# Patient Record
Sex: FEMALE | Race: WHITE | NOT HISPANIC OR LATINO | Employment: STUDENT | URBAN - METROPOLITAN AREA
[De-identification: names, ages, dates, MRNs, and addresses within clinical notes are randomized per-mention and may not be internally consistent; named-entity substitution may affect disease eponyms.]

---

## 2023-02-24 PROBLEM — R51.9 ACUTE NONINTRACTABLE HEADACHE: Status: ACTIVE | Noted: 2023-02-24

## 2023-02-24 PROBLEM — F50.9 DISORDERED EATING: Status: ACTIVE | Noted: 2023-02-24

## 2023-02-24 PROBLEM — F41.9 ANXIETY: Status: ACTIVE | Noted: 2023-02-24

## 2023-02-24 PROBLEM — F50.20 PURGING: Status: ACTIVE | Noted: 2023-02-24

## 2023-02-24 PROBLEM — F50.2: Status: ACTIVE | Noted: 2023-02-24

## 2023-02-24 PROBLEM — R50.9 FEVER: Status: ACTIVE | Noted: 2023-02-24

## 2023-02-24 PROBLEM — F45.22 BODY IMAGE DISTURBANCE: Status: ACTIVE | Noted: 2023-02-24

## 2023-02-24 RX ORDER — FLUOXETINE HYDROCHLORIDE 60 MG/1
60 TABLET, FILM COATED ORAL; ORAL DAILY
COMMUNITY
Start: 2021-12-10 | End: 2023-03-30 | Stop reason: ALTCHOICE

## 2023-02-24 RX ORDER — FLUOXETINE HYDROCHLORIDE 40 MG/1
1 CAPSULE ORAL DAILY
COMMUNITY
Start: 2022-01-21 | End: 2023-03-30 | Stop reason: ALTCHOICE

## 2023-02-24 RX ORDER — CEFDINIR 300 MG/1
300 CAPSULE ORAL EVERY 12 HOURS
COMMUNITY
Start: 2022-06-17 | End: 2023-03-30 | Stop reason: ALTCHOICE

## 2023-03-30 ENCOUNTER — OFFICE VISIT (OUTPATIENT)
Dept: PEDIATRICS | Facility: CLINIC | Age: 18
End: 2023-03-30
Payer: COMMERCIAL

## 2023-03-30 VITALS
SYSTOLIC BLOOD PRESSURE: 108 MMHG | HEIGHT: 66 IN | BODY MASS INDEX: 33.11 KG/M2 | DIASTOLIC BLOOD PRESSURE: 64 MMHG | WEIGHT: 206 LBS | HEART RATE: 87 BPM

## 2023-03-30 DIAGNOSIS — Z00.00 WELLNESS EXAMINATION: Primary | ICD-10-CM

## 2023-03-30 PROBLEM — F50.9 DISORDERED EATING: Status: RESOLVED | Noted: 2023-02-24 | Resolved: 2023-03-30

## 2023-03-30 PROBLEM — R51.9 ACUTE NONINTRACTABLE HEADACHE: Status: RESOLVED | Noted: 2023-02-24 | Resolved: 2023-03-30

## 2023-03-30 PROBLEM — R50.9 FEVER: Status: RESOLVED | Noted: 2023-02-24 | Resolved: 2023-03-30

## 2023-03-30 PROBLEM — F50.9 EATING DISORDER: Status: ACTIVE | Noted: 2022-08-17

## 2023-03-30 PROCEDURE — 1036F TOBACCO NON-USER: CPT | Performed by: PEDIATRICS

## 2023-03-30 PROCEDURE — 99395 PREV VISIT EST AGE 18-39: CPT | Performed by: PEDIATRICS

## 2023-03-30 PROCEDURE — 96127 BRIEF EMOTIONAL/BEHAV ASSMT: CPT | Performed by: PEDIATRICS

## 2023-03-30 NOTE — PROGRESS NOTES
Subjective   Chio is a 17 y.o. female who presents today for her Health Maintenance and Supervision Exam.    General Health:  Chio is overall in good health.  She is working with a psychiatrist at [facility] to manage her eating disorder.  She says she is doing well, and is in a good place.  Is not on any medications currently.  Concerns today: None    Social and Family History:  Currently staying in the dorms at [facility].  Will move into an apartment next school year.  Is finishing her second year in college.  Doing well.    Nutrition:  Balanced diet? Yes  Current Diet: A variety of meats, vegetables, fruits with a calcium source.  Concerns about body image? No  Uses nutritional supplements? No  Denies any recent , or ongoing binging or purging.    Dental Care:  Chio has a dental home? Yes  Dental hygiene regularly performed? Yes  Fluoridate water: Yes    Elimination:  Elimination patterns appropriate: Yes  Sleep:  Sleep patterns : She tries to get 7 to 8 hours nightly  Sleep problems:  none    Behavior/Socialization:  Good relationships with parents and siblings? Yes  Normal peer relationships? Yes     Development/Education:  Age Appropriate: Yes    Activities:  Physical Activity: Yes  Works part time as a [employer]    Menstrual Status:  Regular cycle intervals: Yes    Sexual History:  Dating? No  Sexually Active? No    Drugs:  Tobacco? No  Uses drugs?  Occasional marijuana, alcohol use.  Vaping?  Occasionally    Risk Assessment:  Additional health risks: No    Safety Assessment:  Safety topics reviewed: Yes  Uses safety belts? Yes   Mouthguard for sports ? Yes   Working Smoke detectors/carbon monoxide detectors Yes   Firearms in the home? No   Secondhand smoke? No   Nonviolent home? Yes   Sunscreen use? Yes   Helmets/Sports safety gear? Yes     Mental Health:  Depression screening result Negative   Self confidence?? Yes   Coping Skills Yes   Thoughts of self harm/suicide? No     Objective   /64    "Pulse 87   Ht 1.683 m (5' 6.25\")   Wt 93.4 kg (206 lb)   BMI 33.00 kg/m²   Growth parameters are noted and are appropriate for age.  General:   alert and oriented, in no acute distress   Gait:   normal   Skin:   normal   Oral cavity:   lips, mucosa, and tongue normal; teeth and gums normal   Eyes:   sclerae white, pupils equal and reactive   Ears:   normal bilaterally   Neck:   no adenopathy and thyroid not enlarged, symmetric, no tenderness/mass/nodules   Lungs:  clear to auscultation bilaterally   Heart:   regular rate and rhythm, S1, S2 normal, no murmur, click, rub or gallop   Abdomen:  soft, non-tender; bowel sounds normal; no masses, no organomegaly   :  normal external genitalia, no erythema, no discharge   Jagdeep Stage:   5   Extremities:  extremities normal, warm and well-perfused; no cyanosis, clubbing, or edema, negative forward bend   Neuro:  normal without focal findings and muscle tone and strength normal and symmetric     Assessment/Plan   PLAN:  17 yr old here for her annual physical  Weight gain noted in the chart but not discussed. Patient is following with psychiatry for ongoing care of her eating disorder.   PHQ-9 completed. Risk factors: substance use. Advised against this. Reviewed health risks. Recommend discussion about psychiatry for continued anxious symptoms, re: restarting her Prozac.  Vaccines reviewed and or updated if applicable  Follow up in 1 year for next well child exam or sooner with concerns.           "

## 2023-07-14 ENCOUNTER — TELEMEDICINE (OUTPATIENT)
Dept: PEDIATRICS | Facility: CLINIC | Age: 18
End: 2023-07-14
Payer: COMMERCIAL

## 2023-07-14 VITALS — WEIGHT: 206 LBS | BODY MASS INDEX: 33 KG/M2

## 2023-07-14 DIAGNOSIS — F41.9 ANXIETY: ICD-10-CM

## 2023-07-14 DIAGNOSIS — F41.0 PANIC ATTACK: Primary | ICD-10-CM

## 2023-07-14 DIAGNOSIS — F50.9 EATING DISORDER, UNSPECIFIED TYPE: ICD-10-CM

## 2023-07-14 DIAGNOSIS — F45.22 BODY IMAGE DISTURBANCE: ICD-10-CM

## 2023-07-14 PROCEDURE — 99214 OFFICE O/P EST MOD 30 MIN: CPT | Performed by: PEDIATRICS

## 2023-07-14 RX ORDER — HYDROXYZINE HYDROCHLORIDE 10 MG/1
TABLET, FILM COATED ORAL
Qty: 45 TABLET | Refills: 0 | Status: SHIPPED | OUTPATIENT
Start: 2023-07-14 | End: 2023-08-07 | Stop reason: SDUPTHER

## 2023-07-14 NOTE — PROGRESS NOTES
HPI:  An interactive audio and video telecommunication system which permits real time communications, between the patient at the originating site and physician at the distant site was utilized to provide this telehealth service.   Verbal consent was requested and obtained from mildly on this date July 14, 2023 for a telehealth visit for anxiety concerns.  She reports that late last summer she stopped her Lexapro.  She was originally started on this when she was in the [facility] outpatient program last summer.  She did not feel that it was helping her much.  She had been following a mental health therapist, and eating disorder therapist as well as being monitored by an adolescent medicine/eating disorder specialist Dr. Raffaele Hunt at [facility].  She continues to see these providers.  They are aware she stopped her Lexapro.  She notes over the last few weeks she has become more worked up and anxious.  Unsure why.  She does notice that she seems more anxious now that she is home from college and staying with her family.  Recently weaned 1 week ago she reports that she was on vacation with her family-they were seen in new place and she felt very closed and and tired and developed a panic attack and was crying straight for an hour.  She is very on it if that this will happen again.  She states that at home she feels worked up and very pressured, but she is unsure why.  She feels she has a good relationship with her mom and dad.  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  Just she does admit to frequent weight checking at home.  She states that she was told not to do this by her adolescent medicine specialist.  Denies binging or purging.  She is wanting to go back on the Lexapro.  Believes she was on 20 mg but is not sure.  She denies any thoughts of suicide or self-harm.  She is not using alcohol marijuana or vaping products to cope with her anxiety.  She states she will likely continue  to work with Raffaele and like if she restarts the Lexapro but she wanted to get my opinion and recommendation.    ROS:   negative other than stated above in HPI    Vitals:    07/14/23 1422   Weight: 93.4 kg (206 lb)        Current Outpatient Medications:     hydrOXYzine HCL (Atarax) 10 mg tablet, 1 to 3 tablets by mouth for panic attacks q 8 hrs as needed., Disp: 45 tablet, Rfl: 0     Physical Exam:  Alert, happy pleasant.  Interactive    Assessment and Plan:  Anxiety, panic attacks, eating disorder.  Chio seems to have an increase in anxiety triggering panic attacks.  I do support use of antianxiety medication for her.  I do recommend she continues with her behavioral health counseling.  I discussed options for antianxiety treatment.  She states she wants to go back on Lexapro and give it another try.  She previously had failed Prozac.  Another option would be to do Zoloft.  She states she will likely follow-up with her adolescent medicine specialist.  She is going to check her records to let me know the exact dose of her Lexapro.  Plan to follow-up virtually or in office in 2 weeks after starting the Lexapro.  We will also give her a prescription of hydroxyzine to help with panic attacks.  Discussed the indications, risk benefits of this medication.  Encouraged her to continue her CBT counseling for this.  I also asked her to connect with her adolescent medicine specialist for a follow-up appointment.  Mildly indicated agreement of plan.

## 2023-07-17 ENCOUNTER — TELEPHONE (OUTPATIENT)
Dept: PEDIATRICS | Facility: CLINIC | Age: 18
End: 2023-07-17

## 2023-07-18 DIAGNOSIS — F41.9 ANXIETY: Primary | ICD-10-CM

## 2023-07-18 RX ORDER — ESCITALOPRAM OXALATE 20 MG/1
TABLET ORAL
Qty: 30 TABLET | Refills: 1 | Status: SHIPPED | OUTPATIENT
Start: 2023-07-18 | End: 2023-08-07 | Stop reason: SDUPTHER

## 2023-07-25 ENCOUNTER — APPOINTMENT (OUTPATIENT)
Dept: PEDIATRICS | Facility: CLINIC | Age: 18
End: 2023-07-25
Payer: COMMERCIAL

## 2023-08-07 ENCOUNTER — TELEMEDICINE (OUTPATIENT)
Dept: PEDIATRICS | Facility: CLINIC | Age: 18
End: 2023-08-07
Payer: COMMERCIAL

## 2023-08-07 VITALS — BODY MASS INDEX: 33 KG/M2 | WEIGHT: 206 LBS

## 2023-08-07 DIAGNOSIS — F41.0 PANIC ATTACK: ICD-10-CM

## 2023-08-07 DIAGNOSIS — F41.9 ANXIETY: ICD-10-CM

## 2023-08-07 DIAGNOSIS — F50.2: ICD-10-CM

## 2023-08-07 DIAGNOSIS — F45.22 BODY IMAGE DISTURBANCE: ICD-10-CM

## 2023-08-07 DIAGNOSIS — F50.81 BINGE EATING DISORDER: Primary | ICD-10-CM

## 2023-08-07 PROCEDURE — 99213 OFFICE O/P EST LOW 20 MIN: CPT | Performed by: PEDIATRICS

## 2023-08-07 RX ORDER — HYDROXYZINE HYDROCHLORIDE 10 MG/1
TABLET, FILM COATED ORAL
Qty: 45 TABLET | Refills: 0 | Status: SHIPPED | OUTPATIENT
Start: 2023-08-07 | End: 2023-09-21 | Stop reason: SDUPTHER

## 2023-08-07 RX ORDER — ESCITALOPRAM OXALATE 20 MG/1
TABLET ORAL
Qty: 30 TABLET | Refills: 1 | Status: SHIPPED | OUTPATIENT
Start: 2023-08-07 | End: 2023-11-27 | Stop reason: SDUPTHER

## 2023-08-07 NOTE — PROGRESS NOTES
HPI:  An interactive audio and video telecommunication system which permits real time communications, between the patient at the originating site and physician at the distant site was utilized to provide this telehealth service.   Verbal consent was requested and obtained from Chio on this date 8/7/23 for a telehealth visit for anxiety follow up. Started back on Lexapro 20mg, about 3 weeks ago. Feeling good overall. She is not sure if she feels much different. No panic attacks as before. Has had a few anxious moments for which she has taken hydroxyzine. Takes 10mg of hydrozyine 4-5 x weekly. Has taken 3 in one day over the last 3 weeks. Feels most anxious in the morning. She is not sure why. She is getting ready to go back to campus in 1 week, and is a little nervous about that. She will be in an apartment this year with 3 friends. She continues to meet in person with her counselor. States she is eating and sleeping very well. Denies thoughts of suicide, self harm, or harm to others. Denies binging or purging.    ROS:   negative other than stated above in HPI    Vitals:    08/07/23 0824   Weight: 93.4 kg (206 lb)        Current Outpatient Medications:     escitalopram (Lexapro) 20 mg tablet, 1 tablet at bedtime daily, Disp: 30 tablet, Rfl: 1    hydrOXYzine HCL (Atarax) 10 mg tablet, 1 to 3 tablets by mouth for panic attacks q 8 hrs as needed., Disp: 45 tablet, Rfl: 0     Physical Exam:  Alert. Happy. conversive  Assessment and Plan:  18 yr old female with anxiety and nina eating disorder. Doing well on the initial resumption of Lexapro 20mg. Ewill keep this dose the same. I encouraged her to keep up and the counseling, keep track of anxious symptoms. Virtual or office follow up in 4-5 weeks.

## 2023-09-21 DIAGNOSIS — F41.0 PANIC ATTACK: ICD-10-CM

## 2023-09-21 RX ORDER — HYDROXYZINE HYDROCHLORIDE 10 MG/1
TABLET, FILM COATED ORAL
Qty: 45 TABLET | Refills: 0 | Status: SHIPPED | OUTPATIENT
Start: 2023-09-21

## 2023-11-13 ENCOUNTER — OFFICE VISIT (OUTPATIENT)
Dept: OTOLARYNGOLOGY | Facility: CLINIC | Age: 18
End: 2023-11-13
Payer: COMMERCIAL

## 2023-11-13 VITALS
OXYGEN SATURATION: 99 % | TEMPERATURE: 98.1 F | HEART RATE: 92 BPM | BODY MASS INDEX: 33.95 KG/M2 | WEIGHT: 211.25 LBS | RESPIRATION RATE: 18 BRPM | SYSTOLIC BLOOD PRESSURE: 119 MMHG | HEIGHT: 66 IN | DIASTOLIC BLOOD PRESSURE: 63 MMHG

## 2023-11-13 DIAGNOSIS — J35.8 TONSIL STONE: Primary | ICD-10-CM

## 2023-11-13 DIAGNOSIS — J35.01 CHRONIC TONSILLITIS: ICD-10-CM

## 2023-11-13 PROCEDURE — 99214 OFFICE O/P EST MOD 30 MIN: CPT | Performed by: STUDENT IN AN ORGANIZED HEALTH CARE EDUCATION/TRAINING PROGRAM

## 2023-11-13 PROCEDURE — 99204 OFFICE O/P NEW MOD 45 MIN: CPT | Performed by: STUDENT IN AN ORGANIZED HEALTH CARE EDUCATION/TRAINING PROGRAM

## 2023-11-13 PROCEDURE — 1036F TOBACCO NON-USER: CPT | Performed by: STUDENT IN AN ORGANIZED HEALTH CARE EDUCATION/TRAINING PROGRAM

## 2023-11-13 RX ORDER — SODIUM CHLORIDE, SODIUM LACTATE, POTASSIUM CHLORIDE, CALCIUM CHLORIDE 600; 310; 30; 20 MG/100ML; MG/100ML; MG/100ML; MG/100ML
100 INJECTION, SOLUTION INTRAVENOUS CONTINUOUS
Status: CANCELLED | OUTPATIENT
Start: 2023-11-13

## 2023-11-13 ASSESSMENT — ENCOUNTER SYMPTOMS
DEPRESSION: 0
LOSS OF SENSATION IN FEET: 0
OCCASIONAL FEELINGS OF UNSTEADINESS: 0

## 2023-11-13 ASSESSMENT — COLUMBIA-SUICIDE SEVERITY RATING SCALE - C-SSRS
2. HAVE YOU ACTUALLY HAD ANY THOUGHTS OF KILLING YOURSELF?: NO
1. IN THE PAST MONTH, HAVE YOU WISHED YOU WERE DEAD OR WISHED YOU COULD GO TO SLEEP AND NOT WAKE UP?: NO
6. HAVE YOU EVER DONE ANYTHING, STARTED TO DO ANYTHING, OR PREPARED TO DO ANYTHING TO END YOUR LIFE?: NO
6. HAVE YOU EVER DONE ANYTHING, STARTED TO DO ANYTHING, OR PREPARED TO DO ANYTHING TO END YOUR LIFE?: NO

## 2023-11-13 ASSESSMENT — PATIENT HEALTH QUESTIONNAIRE - PHQ9
SUM OF ALL RESPONSES TO PHQ9 QUESTIONS 1 AND 2: 0
1. LITTLE INTEREST OR PLEASURE IN DOING THINGS: NOT AT ALL
2. FEELING DOWN, DEPRESSED OR HOPELESS: NOT AT ALL

## 2023-11-13 ASSESSMENT — PAIN SCALES - GENERAL: PAINLEVEL: 4

## 2023-11-13 NOTE — PROGRESS NOTES
SUBJECTIVE  Patient ID: Chio Logan is a 18 y.o. female who presents for New Patient Visit (Tonsill issues ).    The patient reports recurrent tonsils stones; several times a week. These cause her discomfort and swelling. Seems worse on the right. Causing halitosis. Tried brushing her teeth; has not helped. Not having recurrent tonsil infections. Has not tried antibiotics for these yet.    No history surgery. No known family history of bleeding disorders.    Review of Systems  Complete ROS negative except as noted above or on patient intake form and as above.    OBJECTIVE  Physical Exam  CONSTITUTIONAL: Well appearing female who appears stated age.  PSYCHIATRIC: Alert, appropriate mood and affect.  RESPIRATORY: Normal inspiration and expiration and chest wall expansion; no use of accessory muscles to breathe.  VOICE: Clear speech without hoarseness. No stridor nor stertor.  HEAD, FACE, AND SKIN: Symmetric facial feature. No cutaneous masses or lesions were visualized. The parotid and submandibular glands were normal to palpation.  EYES: Pupils were equal in size and reactive to light. Extra-ocular muscle function was intact. No nystagmus was observed. Vision was grossly intact.  EARS: External ears were normally formed with no lesions. The external auditory canals were clear. The tympanic membranes were intact and in the neutral position. No significant retraction pockets nor effusions were appreciated.  NOSE: Nasal dorsum was midline. Anterior rhinoscopy demonstrated a septum midline. Inferior turbinates were mildly hypertrophied. No obvious nasal masses, polyps, mucopurulence, nor other lesions were appreciated.  ORAL CAVITY: Lips were without lesions. Moist mucous membranes. No lesions appreciated along the gingiva, oral mucosa, nor tongue.  DENTITION: Grossly normal without obvious infection nor inflammation.  OROPHARYNX: No lesion nor mucosal abnormality. The uvula was normal appearing. The tonsils were  2-3+.  NECK: Visualization and palpation of the neck revealed no mass lesions, no thyromegaly or thyroid masses. No cutaneous lesions appreciated.  LYMPHATICS (CERVICAL): There were no palpable lymph nodes in the posterior triangle, submandibular triangle, jugulodigastric region, nor central neck.  NEUROLOGIC: Cranial nerves III, IV, and VI were noted to be intact via extra-ocular muscle movement testing. Cranial nerve V was noted to be intact to soft touch bilaterally. Cranial nerve VII was noted to be intact and symmetric by facial movement. Cranial nerve VIII was tested with normal voice examination and revealed grossly normal hearing. Cranial nerves IX and X noted to be intact by palatal movement. Cranial nerve XI noted to be intact via shoulder shrug.  Cranial nerve XII noted to be intact with active and symmetric tongue movement.      ASSESSMENT/PLAN  Diagnoses and all orders for this visit:  Tonsil stone  Chronic tonsillitis    18 y.o. female here for chronic tonsillitis and tonsil stones.    Chronic tonsillitis, tonsil stones  The patient has longstanding issue with recurrent tonsil stones in the setting of chronic tonsillitis. The patient's findings including physical exam were discussed.  All options including continued observation, medical management, and surgical management were discussed.  The patient is interested in tonsillectomy with possible adenoidectomy at this time. The risks, benefits, and alternatives to these procedures were discussed with the patient.  Risks discussed included but were not limited to pain, dehydration, weight loss, bad breath, bleeding, including life-threatening bleeding, risks of anesthesia, and other unpredictable risks.  The patient understands these risks and elected to proceed.  Informed consent was signed.    This note was created using speech recognition transcription software. Despite proofreading, typographical errors may be present that affect the meaning of the  content. Please contact my office with any questions.

## 2023-11-27 DIAGNOSIS — F41.9 ANXIETY: ICD-10-CM

## 2023-11-30 RX ORDER — ESCITALOPRAM OXALATE 20 MG/1
TABLET ORAL
Qty: 30 TABLET | Refills: 0 | Status: SHIPPED | OUTPATIENT
Start: 2023-11-30 | End: 2024-02-19 | Stop reason: SDUPTHER

## 2024-01-04 ENCOUNTER — ANESTHESIA EVENT (OUTPATIENT)
Dept: OPERATING ROOM | Facility: HOSPITAL | Age: 19
End: 2024-01-04
Payer: COMMERCIAL

## 2024-01-04 ENCOUNTER — PRE-ADMISSION TESTING (OUTPATIENT)
Dept: PREADMISSION TESTING | Facility: HOSPITAL | Age: 19
End: 2024-01-04
Payer: COMMERCIAL

## 2024-01-04 VITALS
OXYGEN SATURATION: 99 % | TEMPERATURE: 95.4 F | HEART RATE: 80 BPM | WEIGHT: 218.26 LBS | RESPIRATION RATE: 19 BRPM | BODY MASS INDEX: 35.08 KG/M2 | DIASTOLIC BLOOD PRESSURE: 70 MMHG | SYSTOLIC BLOOD PRESSURE: 116 MMHG | HEIGHT: 66 IN

## 2024-01-04 DIAGNOSIS — J35.8 TONSIL STONE: ICD-10-CM

## 2024-01-04 DIAGNOSIS — J35.01 CHRONIC TONSILLITIS: ICD-10-CM

## 2024-01-04 LAB
ANION GAP SERPL CALC-SCNC: 12 MMOL/L (ref 10–20)
APTT PPP: 29 SECONDS (ref 27–38)
BUN SERPL-MCNC: 15 MG/DL (ref 6–23)
CALCIUM SERPL-MCNC: 9.3 MG/DL (ref 8.6–10.3)
CHLORIDE SERPL-SCNC: 103 MMOL/L (ref 98–107)
CO2 SERPL-SCNC: 27 MMOL/L (ref 21–32)
CREAT SERPL-MCNC: 0.87 MG/DL (ref 0.5–1.05)
ERYTHROCYTE [DISTWIDTH] IN BLOOD BY AUTOMATED COUNT: 12.8 % (ref 11.5–14.5)
GFR SERPL CREATININE-BSD FRML MDRD: >90 ML/MIN/1.73M*2
GLUCOSE SERPL-MCNC: 68 MG/DL (ref 74–99)
HCT VFR BLD AUTO: 40.6 % (ref 36–46)
HGB BLD-MCNC: 13.4 G/DL (ref 12–16)
INR PPP: 1 (ref 0.9–1.1)
MCH RBC QN AUTO: 29.3 PG (ref 26–34)
MCHC RBC AUTO-ENTMCNC: 33 G/DL (ref 32–36)
MCV RBC AUTO: 89 FL (ref 80–100)
NRBC BLD-RTO: 0 /100 WBCS (ref 0–0)
PLATELET # BLD AUTO: 279 X10*3/UL (ref 150–450)
POTASSIUM SERPL-SCNC: 3.8 MMOL/L (ref 3.5–5.3)
PROTHROMBIN TIME: 11.3 SECONDS (ref 9.8–12.8)
RBC # BLD AUTO: 4.57 X10*6/UL (ref 4–5.2)
SODIUM SERPL-SCNC: 138 MMOL/L (ref 136–145)
WBC # BLD AUTO: 10.4 X10*3/UL (ref 4.4–11.3)

## 2024-01-04 PROCEDURE — 82374 ASSAY BLOOD CARBON DIOXIDE: CPT

## 2024-01-04 PROCEDURE — 85610 PROTHROMBIN TIME: CPT

## 2024-01-04 PROCEDURE — 99203 OFFICE O/P NEW LOW 30 MIN: CPT | Performed by: NURSE PRACTITIONER

## 2024-01-04 PROCEDURE — 36415 COLL VENOUS BLD VENIPUNCTURE: CPT

## 2024-01-04 PROCEDURE — 85027 COMPLETE CBC AUTOMATED: CPT

## 2024-01-04 ASSESSMENT — DUKE ACTIVITY SCORE INDEX (DASI)
CAN YOU DO MODERATE WORK AROUND THE HOUSE LIKE VACUUMING, SWEEPING FLOORS OR CARRYING GROCERIES: YES
CAN YOU DO HEAVY WORK AROUND THE HOUSE LIKE SCRUBBING FLOORS OR LIFTING AND MOVING HEAVY FURNITURE: YES
CAN YOU CLIMB A FLIGHT OF STAIRS OR WALK UP A HILL: YES
CAN YOU WALK A BLOCK OR TWO ON LEVEL GROUND: YES
CAN YOU PARTICIPATE IN STRENOUS SPORTS LIKE SWIMMING, SINGLES TENNIS, FOOTBALL, BASKETBALL, OR SKIING: NO
CAN YOU TAKE CARE OF YOURSELF (EAT, DRESS, BATHE, OR USE TOILET): YES
DASI METS SCORE: 7
CAN YOU RUN A SHORT DISTANCE: YES
CAN YOU DO YARD WORK LIKE RAKING LEAVES, WEEDING OR PUSHING A MOWER: NO
CAN YOU PARTICIPATE IN MODERATE RECREATIONAL ACTIVITIES LIKE GOLF, BOWLING, DANCING, DOUBLES TENNIS OR THROWING A BASEBALL OR FOOTBALL: NO
CAN YOU DO LIGHT WORK AROUND THE HOUSE LIKE DUSTING OR WASHING DISHES: YES
CAN YOU HAVE SEXUAL RELATIONS: NO
CAN YOU WALK INDOORS, SUCH AS AROUND YOUR HOUSE: YES
TOTAL_SCORE: 34.95

## 2024-01-04 ASSESSMENT — PAIN SCALES - GENERAL: PAINLEVEL_OUTOF10: 8

## 2024-01-04 ASSESSMENT — PAIN DESCRIPTION - DESCRIPTORS: DESCRIPTORS: ACHING

## 2024-01-04 NOTE — CPM/PAT H&P
"CPM/PAT Evaluation       Name: Chio Logan (Chio Logan)  /Age: 2005/18 y.o.     In-Person       Chief Complaint:     18  yr old female with c/o chronic sore throat since 2022    She c/o frequent tonsil stones that cause pain.  She states she would try to pick out and happened more frequently  She states she may have had strep as a child.     She c/o stones occurring multiple times/ week, worse in the AM.  She states pain is rated an 8, as the stone enlarges and after she removes the stone. She states pain is like \"knives.\"   She c/o daily halitosis  She denies trouble swallowing  She states cannot recall any trigger to stones   She is a mouth breather during sleep.    She states she tried improving oral hygiene with no improvement.  She increases cold water intake for pain.     Denies any cardiac or respiratory symptoms.  Denies any past issues with anesthesia.    Adenoidectomy and tonsillectomy is Scheduled on 2023 with Dr. Powers     Vitals:    24 0857   BP: 116/70   Pulse: 80   Resp: 19   Temp: 35.2 °C (95.4 °F)   SpO2: 99%          Past Medical History:   Diagnosis Date    Anxiety     Body mass index (BMI) pediatric, 5th percentile to less than 85th percentile for age 2021    BMI (body mass index), pediatric, 5% to less than 85% for age    Body mass index (BMI) pediatric, 85th percentile to less than 95th percentile for age 06/10/2019    BMI (body mass index), pediatric, 85% to less than 95% for age    Body mass index (BMI) pediatric, 85th percentile to less than 95th percentile for age 07/10/2020    Body mass index (BMI) 85th to less than 95th percentile with athletic build, pediatric    Cataract     Chronic pain disorder     Encounter for general adult medical examination without abnormal findings 2021    Examination, routine, over 18 years of age    Encounter for immunization 10/19/2021    Encounter for immunization    Encounter for routine child health examination with " abnormal findings 07/10/2020    Encounter for routine child health examination with abnormal findings    Encounter for routine child health examination without abnormal findings 06/10/2019    Encounter for routine child health examination without abnormal findings    Influenza due to other identified influenza virus with other respiratory manifestations 02/06/2020    Influenza A    Other conditions influencing health status 01/24/2017    Concussion, without loss of consciousness, initial encounter    Other conditions influencing health status 02/01/2017    Concussion, without loss of consciousness, subsequent encounter    Otitis media, unspecified, bilateral 12/14/2017    Acute bilateral otitis media    Otitis media, unspecified, bilateral 08/29/2019    Acute bilateral otitis media    Personal history of diseases of the skin and subcutaneous tissue 07/10/2020    History of contact dermatitis    Personal history of other diseases of the digestive system 07/06/2021    History of constipation    Personal history of other diseases of the nervous system and sense organs 03/04/2015    History of acute otitis media    Personal history of other specified conditions 09/10/2021    History of vomiting    Pityriasis versicolor 06/13/2018    Tinea versicolor    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    Family History   Problem Relation Name Age of Onset    Other (OVERWIGHT) Mother      Hyperlipidemia Father      Other (OVERWEIGHT) Father      Hyperlipidemia Paternal Grandfather      Hypertension Paternal Grandfather      Melanoma Paternal Grandfather         No Known Allergies    Prior to Admission medications    Medication Sig Start Date End Date Taking? Authorizing Provider   escitalopram (Lexapro) 20 mg tablet 1 tablet at bedtime daily 11/30/23   Charity Jones DO    hydrOXYzine HCL (Atarax) 10 mg tablet 1 to 3 tablets by mouth for panic attacks q 8 hrs as needed. 9/21/23   Charity Jones, DO        Review of Systems  Constitutional: NO F, chills, or sweats  Eyes: no blurred vision or visual disturbance  ENT: see HPI, denies congestion or difficulty hearing  Cardiovascular: + palpitations with anxiety, no chest pain, no edema,  and no syncope.   Respiratory: no cough,no s.o.b. and no wheezing  Gastrointestinal: GERD, no abdominal pain, no N/V, no blood in stools  Genitourinary: LMP:  12/18/2023, no dysuria, no urinary frequency, no urinary hesitancy and no feelings of urinary urgency.   Musculoskeletal: no arthralgias,  no back pain and no myalgias.   Integumentary: no new skin lesions and no rashes.   Neurological: no difficulty walking, no headache, no limb weakness, no numbness and no tingling.   Endocrine: no recent weight gain and no recent weight loss.   Hematologic/Lymphatic: no tendency for easy bruising and no swollen glands.        Physical Exam  Constitutional:       Appearance: Normal appearance.   HENT:      Mouth/Throat:      Pharynx: Uvula midline. Posterior oropharyngeal erythema present.      Tonsils: 1+ on the right. 1+ on the left.   Cardiovascular:      Rate and Rhythm: Normal rate.      Heart sounds: Normal heart sounds.   Pulmonary:      Effort: Pulmonary effort is normal.      Breath sounds: Normal breath sounds.   Abdominal:      General: Bowel sounds are normal.      Palpations: Abdomen is soft.   Musculoskeletal:         General: Normal range of motion.      Cervical back: Normal range of motion.      Right lower leg: No edema.      Left lower leg: No edema.   Skin:     General: Skin is warm and dry.   Neurological:      Mental Status: She is alert and oriented to person, place, and time.          PAT AIRWAY:   Airway:     Mallampati::  I    TM distance::  <3 FB    Neck ROM::  Full  normal        Visit Vitals  /70   Pulse 80   Temp  35.2 °C (95.4 °F)   Resp 19       DASI Risk Score      Flowsheet Row Most Recent Value   DASI SCORE 34.95   METS Score (Will be calculated only when all the questions are answered) 7          Caprini DVT Assessment    No data to display       Modified Frailty Index    No data to display       CHADS2 Stroke Risk  Current as of 8 minutes ago        N/A 3 - 100%: High Risk   2 - 3%: Medium Risk   0 - 2%: Low Risk     Last Change: N/A          This score determines the patient's risk of having a stroke if the patient has atrial fibrillation.        This score is not applicable to this patient. Components are not calculated.          Revised Cardiac Risk Index    No data to display       Apfel Simplified Score    No data to display       Risk Analysis Index Results This Encounter    No data found in the last 1 encounters.       Stop Bang Score      Flowsheet Row Most Recent Value   Do you snore loudly? 0   Do you often feel tired or fatigued after your sleep? 0   Has anyone ever observed you stop breathing in your sleep? 0   Do you have or are you being treated for high blood pressure? 0   Recent BMI (Calculated) 35.2   Is BMI greater than 35 kg/m2? 1=Yes   Age older than 50 years old? 0=No   Gender - Male 0=No

## 2024-01-04 NOTE — PREPROCEDURE INSTRUCTIONS
Medication List            Accurate as of January 4, 2024  9:05 AM. Always use your most recent med list.                escitalopram 20 mg tablet  Commonly known as: Lexapro  1 tablet at bedtime daily  Medication Adjustments for Surgery: Continue until night before surgery     hydrOXYzine HCL 10 mg tablet  Commonly known as: Atarax  1 to 3 tablets by mouth for panic attacks q 8 hrs as needed.  Medication Adjustments for Surgery: Continue until night before surgery                              NPO Instructions:    Do not eat any food after midnight the night before your surgery/procedure.    Additional Instructions:     Day of Surgery:  Wear  comfortable loose fitting clothing  Do not use moisturizers, creams, lotions or perfume  All jewelry and valuables should be left at home    Brush your teeth in am  Have designated  to take you home  No contacts for surgery  Bring urine sample

## 2024-01-05 ENCOUNTER — HOSPITAL ENCOUNTER (OUTPATIENT)
Facility: HOSPITAL | Age: 19
Setting detail: OUTPATIENT SURGERY
Discharge: HOME | End: 2024-01-05
Attending: STUDENT IN AN ORGANIZED HEALTH CARE EDUCATION/TRAINING PROGRAM | Admitting: STUDENT IN AN ORGANIZED HEALTH CARE EDUCATION/TRAINING PROGRAM
Payer: COMMERCIAL

## 2024-01-05 ENCOUNTER — ANESTHESIA (OUTPATIENT)
Dept: OPERATING ROOM | Facility: HOSPITAL | Age: 19
End: 2024-01-05
Payer: COMMERCIAL

## 2024-01-05 VITALS
SYSTOLIC BLOOD PRESSURE: 107 MMHG | BODY MASS INDEX: 35.08 KG/M2 | RESPIRATION RATE: 16 BRPM | DIASTOLIC BLOOD PRESSURE: 61 MMHG | WEIGHT: 218.26 LBS | HEIGHT: 66 IN | HEART RATE: 72 BPM | TEMPERATURE: 97.2 F | OXYGEN SATURATION: 97 %

## 2024-01-05 DIAGNOSIS — G89.18 POSTOPERATIVE PAIN: ICD-10-CM

## 2024-01-05 DIAGNOSIS — J35.8 TONSIL STONE: Primary | ICD-10-CM

## 2024-01-05 DIAGNOSIS — J35.01 CHRONIC TONSILLITIS: ICD-10-CM

## 2024-01-05 LAB — PREGNANCY TEST URINE, POC: NEGATIVE

## 2024-01-05 PROCEDURE — 3600000008 HC OR TIME - EACH INCREMENTAL 1 MINUTE - PROCEDURE LEVEL THREE: Performed by: STUDENT IN AN ORGANIZED HEALTH CARE EDUCATION/TRAINING PROGRAM

## 2024-01-05 PROCEDURE — 88304 TISSUE EXAM BY PATHOLOGIST: CPT | Performed by: PATHOLOGY

## 2024-01-05 PROCEDURE — 81025 URINE PREGNANCY TEST: CPT | Performed by: STUDENT IN AN ORGANIZED HEALTH CARE EDUCATION/TRAINING PROGRAM

## 2024-01-05 PROCEDURE — A42821 PR REMOVE TONSILS/ADENOIDS,12+ Y/O: Performed by: NURSE ANESTHETIST, CERTIFIED REGISTERED

## 2024-01-05 PROCEDURE — 3700000002 HC GENERAL ANESTHESIA TIME - EACH INCREMENTAL 1 MINUTE: Performed by: STUDENT IN AN ORGANIZED HEALTH CARE EDUCATION/TRAINING PROGRAM

## 2024-01-05 PROCEDURE — 2500000005 HC RX 250 GENERAL PHARMACY W/O HCPCS: Performed by: STUDENT IN AN ORGANIZED HEALTH CARE EDUCATION/TRAINING PROGRAM

## 2024-01-05 PROCEDURE — 3600000003 HC OR TIME - INITIAL BASE CHARGE - PROCEDURE LEVEL THREE: Performed by: STUDENT IN AN ORGANIZED HEALTH CARE EDUCATION/TRAINING PROGRAM

## 2024-01-05 PROCEDURE — 7100000010 HC PHASE TWO TIME - EACH INCREMENTAL 1 MINUTE: Performed by: STUDENT IN AN ORGANIZED HEALTH CARE EDUCATION/TRAINING PROGRAM

## 2024-01-05 PROCEDURE — 2500000001 HC RX 250 WO HCPCS SELF ADMINISTERED DRUGS (ALT 637 FOR MEDICARE OP): Performed by: STUDENT IN AN ORGANIZED HEALTH CARE EDUCATION/TRAINING PROGRAM

## 2024-01-05 PROCEDURE — 7100000009 HC PHASE TWO TIME - INITIAL BASE CHARGE: Performed by: STUDENT IN AN ORGANIZED HEALTH CARE EDUCATION/TRAINING PROGRAM

## 2024-01-05 PROCEDURE — 42821 REMOVE TONSILS AND ADENOIDS: CPT | Performed by: STUDENT IN AN ORGANIZED HEALTH CARE EDUCATION/TRAINING PROGRAM

## 2024-01-05 PROCEDURE — 3700000001 HC GENERAL ANESTHESIA TIME - INITIAL BASE CHARGE: Performed by: STUDENT IN AN ORGANIZED HEALTH CARE EDUCATION/TRAINING PROGRAM

## 2024-01-05 PROCEDURE — 2500000004 HC RX 250 GENERAL PHARMACY W/ HCPCS (ALT 636 FOR OP/ED): Performed by: NURSE ANESTHETIST, CERTIFIED REGISTERED

## 2024-01-05 PROCEDURE — 2500000005 HC RX 250 GENERAL PHARMACY W/O HCPCS: Performed by: NURSE ANESTHETIST, CERTIFIED REGISTERED

## 2024-01-05 PROCEDURE — 2500000004 HC RX 250 GENERAL PHARMACY W/ HCPCS (ALT 636 FOR OP/ED): Performed by: STUDENT IN AN ORGANIZED HEALTH CARE EDUCATION/TRAINING PROGRAM

## 2024-01-05 PROCEDURE — 2720000007 HC OR 272 NO HCPCS: Performed by: STUDENT IN AN ORGANIZED HEALTH CARE EDUCATION/TRAINING PROGRAM

## 2024-01-05 PROCEDURE — 7100000002 HC RECOVERY ROOM TIME - EACH INCREMENTAL 1 MINUTE: Performed by: STUDENT IN AN ORGANIZED HEALTH CARE EDUCATION/TRAINING PROGRAM

## 2024-01-05 PROCEDURE — A42821 PR REMOVE TONSILS/ADENOIDS,12+ Y/O: Performed by: ANESTHESIOLOGY

## 2024-01-05 PROCEDURE — 7100000001 HC RECOVERY ROOM TIME - INITIAL BASE CHARGE: Performed by: STUDENT IN AN ORGANIZED HEALTH CARE EDUCATION/TRAINING PROGRAM

## 2024-01-05 RX ORDER — HYDROMORPHONE HYDROCHLORIDE 1 MG/ML
1 INJECTION, SOLUTION INTRAMUSCULAR; INTRAVENOUS; SUBCUTANEOUS EVERY 5 MIN PRN
Status: DISCONTINUED | OUTPATIENT
Start: 2024-01-05 | End: 2024-01-05 | Stop reason: HOSPADM

## 2024-01-05 RX ORDER — PROPOFOL 10 MG/ML
INJECTION, EMULSION INTRAVENOUS CONTINUOUS PRN
Status: DISCONTINUED | OUTPATIENT
Start: 2024-01-05 | End: 2024-01-05

## 2024-01-05 RX ORDER — SODIUM CHLORIDE, SODIUM LACTATE, POTASSIUM CHLORIDE, CALCIUM CHLORIDE 600; 310; 30; 20 MG/100ML; MG/100ML; MG/100ML; MG/100ML
100 INJECTION, SOLUTION INTRAVENOUS CONTINUOUS
Status: DISCONTINUED | OUTPATIENT
Start: 2024-01-05 | End: 2024-01-05 | Stop reason: HOSPADM

## 2024-01-05 RX ORDER — PROPOFOL 10 MG/ML
INJECTION, EMULSION INTRAVENOUS AS NEEDED
Status: DISCONTINUED | OUTPATIENT
Start: 2024-01-05 | End: 2024-01-05

## 2024-01-05 RX ORDER — LIDOCAINE HYDROCHLORIDE 10 MG/ML
0.1 INJECTION INFILTRATION; PERINEURAL ONCE
Status: DISCONTINUED | OUTPATIENT
Start: 2024-01-05 | End: 2024-01-05 | Stop reason: HOSPADM

## 2024-01-05 RX ORDER — MIDAZOLAM HYDROCHLORIDE 1 MG/ML
INJECTION, SOLUTION INTRAMUSCULAR; INTRAVENOUS AS NEEDED
Status: DISCONTINUED | OUTPATIENT
Start: 2024-01-05 | End: 2024-01-05

## 2024-01-05 RX ORDER — OXYCODONE HYDROCHLORIDE 5 MG/1
5 TABLET ORAL EVERY 6 HOURS PRN
Qty: 28 TABLET | Refills: 0 | Status: SHIPPED | OUTPATIENT
Start: 2024-01-05 | End: 2024-01-12

## 2024-01-05 RX ORDER — ONDANSETRON HYDROCHLORIDE 2 MG/ML
INJECTION, SOLUTION INTRAVENOUS AS NEEDED
Status: DISCONTINUED | OUTPATIENT
Start: 2024-01-05 | End: 2024-01-05

## 2024-01-05 RX ORDER — DEXMEDETOMIDINE HYDROCHLORIDE 100 UG/ML
INJECTION, SOLUTION INTRAVENOUS AS NEEDED
Status: DISCONTINUED | OUTPATIENT
Start: 2024-01-05 | End: 2024-01-05

## 2024-01-05 RX ORDER — LIDOCAINE HYDROCHLORIDE 40 MG/ML
INJECTION, SOLUTION RETROBULBAR AS NEEDED
Status: DISCONTINUED | OUTPATIENT
Start: 2024-01-05 | End: 2024-01-05

## 2024-01-05 RX ORDER — FENTANYL CITRATE 50 UG/ML
INJECTION, SOLUTION INTRAMUSCULAR; INTRAVENOUS AS NEEDED
Status: DISCONTINUED | OUTPATIENT
Start: 2024-01-05 | End: 2024-01-05

## 2024-01-05 RX ORDER — IBUPROFEN 600 MG/1
600 TABLET ORAL EVERY 6 HOURS PRN
Qty: 80 TABLET | Refills: 1 | Status: SHIPPED | OUTPATIENT
Start: 2024-01-05 | End: 2024-01-20

## 2024-01-05 RX ORDER — POLYETHYLENE GLYCOL 3350 17 G/17G
17 POWDER, FOR SOLUTION ORAL DAILY
Qty: 15 PACKET | Refills: 0 | Status: SHIPPED | OUTPATIENT
Start: 2024-01-05 | End: 2024-01-20

## 2024-01-05 RX ORDER — LIDOCAINE HCL/PF 100 MG/5ML
SYRINGE (ML) INTRAVENOUS AS NEEDED
Status: DISCONTINUED | OUTPATIENT
Start: 2024-01-05 | End: 2024-01-05

## 2024-01-05 RX ORDER — ROCURONIUM BROMIDE 10 MG/ML
INJECTION, SOLUTION INTRAVENOUS AS NEEDED
Status: DISCONTINUED | OUTPATIENT
Start: 2024-01-05 | End: 2024-01-05

## 2024-01-05 RX ORDER — ACETAMINOPHEN 325 MG/1
650 TABLET ORAL EVERY 6 HOURS PRN
Qty: 80 TABLET | Refills: 1 | Status: SHIPPED | OUTPATIENT
Start: 2024-01-05 | End: 2024-02-04

## 2024-01-05 RX ORDER — DEXAMETHASONE SODIUM PHOSPHATE 100 MG/10ML
INJECTION INTRAMUSCULAR; INTRAVENOUS AS NEEDED
Status: DISCONTINUED | OUTPATIENT
Start: 2024-01-05 | End: 2024-01-05

## 2024-01-05 RX ORDER — MEPERIDINE HYDROCHLORIDE 25 MG/ML
12.5 INJECTION INTRAMUSCULAR; INTRAVENOUS; SUBCUTANEOUS EVERY 10 MIN PRN
Status: DISCONTINUED | OUTPATIENT
Start: 2024-01-05 | End: 2024-01-05 | Stop reason: HOSPADM

## 2024-01-05 RX ORDER — OXYMETAZOLINE HCL 0.05 %
SPRAY, NON-AEROSOL (ML) NASAL AS NEEDED
Status: DISCONTINUED | OUTPATIENT
Start: 2024-01-05 | End: 2024-01-05 | Stop reason: HOSPADM

## 2024-01-05 RX ORDER — BUPIVACAINE HYDROCHLORIDE 5 MG/ML
INJECTION, SOLUTION PERINEURAL AS NEEDED
Status: DISCONTINUED | OUTPATIENT
Start: 2024-01-05 | End: 2024-01-05 | Stop reason: HOSPADM

## 2024-01-05 RX ADMIN — DEXMEDETOMIDINE HCL 8 MCG: 100 INJECTION INTRAVENOUS at 08:18

## 2024-01-05 RX ADMIN — PROPOFOL 50 MG: 10 INJECTION, EMULSION INTRAVENOUS at 08:12

## 2024-01-05 RX ADMIN — DEXMEDETOMIDINE HCL 8 MCG: 100 INJECTION INTRAVENOUS at 07:37

## 2024-01-05 RX ADMIN — DEXMEDETOMIDINE HCL 12 MCG: 100 INJECTION INTRAVENOUS at 08:12

## 2024-01-05 RX ADMIN — PROPOFOL 50 MG: 10 INJECTION, EMULSION INTRAVENOUS at 07:39

## 2024-01-05 RX ADMIN — PROPOFOL 50 MG: 10 INJECTION, EMULSION INTRAVENOUS at 08:26

## 2024-01-05 RX ADMIN — MIDAZOLAM 2 MG: 1 INJECTION INTRAMUSCULAR; INTRAVENOUS at 07:25

## 2024-01-05 RX ADMIN — FENTANYL CITRATE 50 MCG: 50 INJECTION, SOLUTION INTRAMUSCULAR; INTRAVENOUS at 07:52

## 2024-01-05 RX ADMIN — DEXAMETHASONE SODIUM PHOSPHATE 8 MG: 10 INJECTION INTRAMUSCULAR; INTRAVENOUS at 07:47

## 2024-01-05 RX ADMIN — LIDOCAINE HYDROCHLORIDE 50 MG: 20 INJECTION INTRAVENOUS at 07:37

## 2024-01-05 RX ADMIN — DEXMEDETOMIDINE HCL 12 MCG: 100 INJECTION INTRAVENOUS at 07:52

## 2024-01-05 RX ADMIN — PROPOFOL 50 MG: 10 INJECTION, EMULSION INTRAVENOUS at 08:08

## 2024-01-05 RX ADMIN — DEXMEDETOMIDINE HCL 12 MCG: 100 INJECTION INTRAVENOUS at 07:30

## 2024-01-05 RX ADMIN — SODIUM CHLORIDE, SODIUM LACTATE, POTASSIUM CHLORIDE, AND CALCIUM CHLORIDE: 600; 310; 30; 20 INJECTION, SOLUTION INTRAVENOUS at 07:26

## 2024-01-05 RX ADMIN — DEXMEDETOMIDINE HCL 8 MCG: 100 INJECTION INTRAVENOUS at 08:05

## 2024-01-05 RX ADMIN — ROCURONIUM BROMIDE 50 MG: 10 INJECTION, SOLUTION INTRAVENOUS at 07:39

## 2024-01-05 RX ADMIN — PROPOFOL 150 MG: 10 INJECTION, EMULSION INTRAVENOUS at 07:37

## 2024-01-05 RX ADMIN — ONDANSETRON 4 MG: 2 INJECTION INTRAMUSCULAR; INTRAVENOUS at 08:18

## 2024-01-05 RX ADMIN — SUGAMMADEX 200 MG: 100 INJECTION, SOLUTION INTRAVENOUS at 08:42

## 2024-01-05 RX ADMIN — FENTANYL CITRATE 50 MCG: 50 INJECTION, SOLUTION INTRAMUSCULAR; INTRAVENOUS at 07:37

## 2024-01-05 RX ADMIN — LIDOCAINE HYDROCHLORIDE 3 ML: 40 INJECTION, SOLUTION RETROBULBAR; TOPICAL at 07:40

## 2024-01-05 SDOH — HEALTH STABILITY: MENTAL HEALTH: CURRENT SMOKER: 0

## 2024-01-05 ASSESSMENT — PAIN SCALES - GENERAL
PAINLEVEL_OUTOF10: 3
PAINLEVEL_OUTOF10: 0 - NO PAIN
PAIN_LEVEL: 0
PAINLEVEL_OUTOF10: 0 - NO PAIN
PAINLEVEL_OUTOF10: 0 - NO PAIN

## 2024-01-05 ASSESSMENT — COLUMBIA-SUICIDE SEVERITY RATING SCALE - C-SSRS
6. HAVE YOU EVER DONE ANYTHING, STARTED TO DO ANYTHING, OR PREPARED TO DO ANYTHING TO END YOUR LIFE?: NO
2. HAVE YOU ACTUALLY HAD ANY THOUGHTS OF KILLING YOURSELF?: NO
1. IN THE PAST MONTH, HAVE YOU WISHED YOU WERE DEAD OR WISHED YOU COULD GO TO SLEEP AND NOT WAKE UP?: NO
6. HAVE YOU EVER DONE ANYTHING, STARTED TO DO ANYTHING, OR PREPARED TO DO ANYTHING TO END YOUR LIFE?: NO

## 2024-01-05 ASSESSMENT — PAIN DESCRIPTION - DESCRIPTORS: DESCRIPTORS: DULL;BURNING

## 2024-01-05 ASSESSMENT — PAIN - FUNCTIONAL ASSESSMENT
PAIN_FUNCTIONAL_ASSESSMENT: 0-10
PAIN_FUNCTIONAL_ASSESSMENT: 0-10

## 2024-01-05 NOTE — ANESTHESIA PREPROCEDURE EVALUATION
Patient: Chio Logan    Procedure Information       Date/Time: 01/05/24 0730    Procedures:       ADENOIDECOMY      TONSILLECTOMY    Location: [facility] / [facility]    Surgeons: Azeem Powers MD            Relevant Problems   Neuro/Psych   (+) Anxiety      Other   (+) Chronic tonsillitis   (+) Tonsil stone       Clinical information reviewed:   Tobacco  Allergies  Meds   Med Hx  Surg Hx  OB Status  Fam Hx  Soc   Hx        NPO Detail:  NPO/Void Status  Carbonhydrate Drink Given Prior to Surgery? : N  Date of Last Liquid: 01/04/24  Time of Last Liquid: 2300  Date of Last Solid: 01/04/24  Time of Last Solid: 2030  Last Intake Type: Solid meal  Time of Last Void: 0653         Physical Exam    Airway  Mallampati: I  TM distance: >3 FB  Neck ROM: full     Cardiovascular - normal exam     Dental - normal exam     Pulmonary - normal exam     Abdominal   (+) obese             Anesthesia Plan    ASA 2     general     The patient is not a current smoker.  Patient was previously instructed to abstain from smoking on day of procedure.  Patient did not smoke on day of procedure.    intravenous induction   Postoperative administration of opioids is intended.  Trial extubation is planned.  Anesthetic plan and risks discussed with patient.  Use of blood products discussed with patient who consented to blood products.    Plan discussed with CRNA.

## 2024-01-05 NOTE — ANESTHESIA POSTPROCEDURE EVALUATION
Patient: Chio Logan    Procedure Summary       Date: 01/05/24 Room / Location: [facility] / [facility]    Anesthesia Start: 0725 Anesthesia Stop: 0854    Procedures:       ADENOIDECOMY (Bilateral)      TONSILLECTOMY (Bilateral) Diagnosis:       Tonsil stone      Chronic tonsillitis      (Tonsil stone [J35.8])      (Chronic tonsillitis [J35.01])    Surgeons: Azeem Powers MD Responsible Provider: Johnny Guzman MD    Anesthesia Type: general ASA Status: 2            Anesthesia Type: general    Vitals Value Taken Time   /58 01/05/24 0854   Temp 36.3 01/05/24 0854   Pulse 74 01/05/24 0852   Resp 12 01/05/24 0852   SpO2 97 % 01/05/24 0852   Vitals shown include unvalidated device data.    Anesthesia Post Evaluation    Patient location during evaluation: PACU  Patient participation: complete - patient participated  Level of consciousness: awake and alert  Pain score: 0  Pain management: adequate  Multimodal analgesia pain management approach  Airway patency: patent  Two or more strategies used to mitigate risk of obstructive sleep apnea  Cardiovascular status: acceptable and hemodynamically stable  Respiratory status: acceptable  Hydration status: acceptable  Postoperative Nausea and Vomiting: none        No notable events documented.

## 2024-01-05 NOTE — DISCHARGE INSTRUCTIONS
Congratulations on having your tonsils removed! Please see below for some general recommendations:    Pain  You can expect to have an extremely sore throat for up to two weeks after tonsillectomy. For pain control take ibuprofen and acetaminophen together every 6 hours. For more severe pain you can take a narcotic, if one was prescribed for you, in addition to the ibuprofen and acetaminophen.    Eating and drinking  Make sure to stay hydrated and drink as many liquids as possible. It is normal to not eat solid food for several days following surgery - as long as you are drinking this is fine. You can eat whatever you like, but avoid sharp foods (like chips) or foods that are tough to chew (like steak). It is safe to eat if you can cut it with a fork.    Bleeding  A small amount of blood-tinged spit is normal after a tonsillectomy for the first several days. Some people have more serious bleeding after a tonsillectomy. This can occur anytime during your healing period up to two weeks from surgery.    If you have bleeding:  - Small amount of bleeding: Drink ice water and sit down and rest.  - Large amount of bleeding or bleeding that does not stop: Return to the emergency department.    Prevent bleeding: Do the following to prevent or reduce the risk of bleeding from your tonsil areas:  - Do not smoke or go to smoky areas after your surgery while your throat is healing. Smoke can irritate the healing tissue of your throat.  - Avoid drinking liquids or eating foods that are spicy or have sharp edges (such as chips).  - Avoid harsh gargling or tooth brushing. Gently brush your teeth and rinse your mouth as directed.    You may also have ear pain, numbness and tingling of your tongue, and low grade fevers for several days after surgery. All of these are normal and should resolve on their own.    For questions or concerns please give our office a call at [phone]. For emergency questions after hours there is an  answering service through our office number that can contact Dr. Powers. You can also call [facility] at [phone] and request to speak to the “ENT resident physician on call.”

## 2024-01-05 NOTE — OP NOTE
OPERATIVE NOTE    Date:  2024 OR Location: [facility]    Name: Chio Logan : 2005, Age: 18 y.o., MRN: [id number], Sex: female    Surgeons      Azeem Powers MD    Resident/Fellow/Other Assistant  None were associated with this case.    Anesthesia: General  ASA: II  Anesthesia Staff: Anesthesiologist: Johnny Guzman MD  CRNA: GRACIELA Benjamin-CRNA, DANIELA  SRNA: Lyla Alonzo  Staff: Circulator: Kylah Villalpando RN; Luz Michelle RN  Scrub Person: Kiana Al       Preoperative Diagnosis:  1.  Chronic tonsillitis    Postoperative Diagnosis:  1.  Chronic tonsillitis    Procedure Performed:  Tonsillectomy and adenoidectomy, age greater than 12    Indications:  Chio Logan is a 18 y.o. female who presented with recurrent tonsil stones and findings on exam concerning for chronic tonsillitis.  After failing conservative therapy patient was interested in tonsillectomy with possible adenoidectomy.  The risk, benefits, and alternatives were discussed in the office; see outpatient notes for further details.    Operative Findings:  1.  2+ tonsils bilaterally with prominent endophytic component.  There is significant scarring and tonsil with debris appreciated bilaterally.  2.  Adenoid pad was similarly cryptic was therefore resected using suction cautery.    Operative Technique:  The patient was seen in the preoperative area where consent was confirmed and all questions were answered. The patient was brought back to the operating room where a full team timeout was performed. The patient was induced and intubated by the anesthesia team. The table was turned 90 degrees. The patient was positioned with neck in extension.    A second confirmatory timeout was performed. A Hanson-Dinh mouth gag was used to open the mouth and elevate the patient´s head from the table. A red rubber catheter was inserted in a nasal cavity and used to elevate the soft palate. The adenoids were evaluated using a mirror  with findings as above; these were resected using suction cautery.  The red rubber catheter was removed. The tonsils and palate were evaluated with findings as described above. We used monopolar cautery to remove the tonsils, first the right and then the left, with a combination of bipolar and suction cautery used for hemostasis. The mouth was irrigated with copious normal saline. The patient was taken out of elevation and the mouth allowed to relax. After an appropriate amount of time the mouth was reevaluated. Any further bleeding was then controlled. 0.5% bupivacaine was injected submucosally in the tonsillar beds bilaterally. An orogastric tube was passed into the stomach and the gastric contents were evacuated.    All instrumentation was then removed from the patient who was then turned back and returned to the care of the anesthesia team for emergence and extubation. The patient was transported to the recovery area. There were no immediate complications appreciated.    Attending Attestation: I performed the procedure.    Implants: Nothing was implanted during the procedure  Specimens:   ID Type Source Tests Collected by Time   1 : LEFT TONSIL Tissue TONSIL RESECTION LEFT SURGICAL PATHOLOGY EXAM Azeem Powers MD 1/5/2024 0743   2 : RIGHT TONSIL Tissue TONSIL RESECTION RIGHT SURGICAL PATHOLOGY EXAM Azeem Powers MD 1/5/2024 0738     Estimated Blood Loss: 20 mL  Complications: none  Condition of the patient: Stable  Disposition: PACU      Azeem Powers MD  [phone]

## 2024-01-05 NOTE — ANESTHESIA PROCEDURE NOTES
Airway  Date/Time: 1/5/2024 7:41 AM  Urgency: elective    Airway not difficult    Staffing  Performed: SRNA   Authorized by: Johnny Guzman MD    Performed by: GRACIELA Benjamin-CRNA, DANIELA  Patient location during procedure: OR    Indications and Patient Condition  Indications for airway management: anesthesia  Sedation level: deep  Preoxygenated: yes  Patient position: sniffing  MILS maintained throughout  Mask difficulty assessment: 1 - vent by mask  Planned trial extubation    Final Airway Details  Final airway type: endotracheal airway      Successful airway: ETT  Cuffed: yes   Successful intubation technique: direct laryngoscopy  Facilitating devices/methods: intubating stylet  Endotracheal tube insertion site: oral  Blade: Lia  Blade size: #3  ETT size (mm): 7.0  Cormack-Lehane Classification: grade I - full view of glottis  Placement verified by: chest auscultation and capnometry   Measured from: lips  ETT to lips (cm): 21  Number of attempts at approach: 1

## 2024-01-08 ENCOUNTER — HOSPITAL ENCOUNTER (EMERGENCY)
Facility: HOSPITAL | Age: 19
Discharge: HOME | End: 2024-01-08
Payer: COMMERCIAL

## 2024-01-08 VITALS
RESPIRATION RATE: 18 BRPM | TEMPERATURE: 97.5 F | HEIGHT: 67 IN | SYSTOLIC BLOOD PRESSURE: 114 MMHG | BODY MASS INDEX: 31.39 KG/M2 | DIASTOLIC BLOOD PRESSURE: 69 MMHG | WEIGHT: 200 LBS | HEART RATE: 81 BPM | OXYGEN SATURATION: 97 %

## 2024-01-08 DIAGNOSIS — J95.830 POST-TONSILLECTOMY HEMORRHAGE: Primary | ICD-10-CM

## 2024-01-08 PROCEDURE — 99284 EMERGENCY DEPT VISIT MOD MDM: CPT

## 2024-01-08 PROCEDURE — 99283 EMERGENCY DEPT VISIT LOW MDM: CPT

## 2024-01-08 PROCEDURE — 99024 POSTOP FOLLOW-UP VISIT: CPT | Performed by: STUDENT IN AN ORGANIZED HEALTH CARE EDUCATION/TRAINING PROGRAM

## 2024-01-08 PROCEDURE — 2500000001 HC RX 250 WO HCPCS SELF ADMINISTERED DRUGS (ALT 637 FOR MEDICARE OP)

## 2024-01-08 RX ORDER — TRIPROLIDINE/PSEUDOEPHEDRINE 2.5MG-60MG
600 TABLET ORAL ONCE
Status: COMPLETED | OUTPATIENT
Start: 2024-01-08 | End: 2024-01-08

## 2024-01-08 RX ADMIN — IBUPROFEN 600 MG: 100 SUSPENSION ORAL at 19:27

## 2024-01-08 ASSESSMENT — PAIN SCALES - GENERAL: PAINLEVEL_OUTOF10: 10 - WORST POSSIBLE PAIN

## 2024-01-08 ASSESSMENT — PAIN DESCRIPTION - ORIENTATION: ORIENTATION: LEFT;RIGHT

## 2024-01-08 ASSESSMENT — COLUMBIA-SUICIDE SEVERITY RATING SCALE - C-SSRS
1. IN THE PAST MONTH, HAVE YOU WISHED YOU WERE DEAD OR WISHED YOU COULD GO TO SLEEP AND NOT WAKE UP?: NO
2. HAVE YOU ACTUALLY HAD ANY THOUGHTS OF KILLING YOURSELF?: NO
6. HAVE YOU EVER DONE ANYTHING, STARTED TO DO ANYTHING, OR PREPARED TO DO ANYTHING TO END YOUR LIFE?: NO

## 2024-01-08 ASSESSMENT — PAIN DESCRIPTION - LOCATION: LOCATION: THROAT

## 2024-01-08 ASSESSMENT — PAIN - FUNCTIONAL ASSESSMENT: PAIN_FUNCTIONAL_ASSESSMENT: 0-10

## 2024-01-08 NOTE — ED PROVIDER NOTES
HPI   No chief complaint on file.      Chio is a 18-year-old female with a recent tonsillectomy on Friday, 1/5/2024, presented to the emergency room with bleeding.  Patient states that she has been spitting out bright red blood for one hour.  According to mom, by time they had gotten here they had not yet spoken to Dr. Powers, her surgeon.  He then called them back when they were in the waiting room and told them they probably did not need to come here.  At this time, patient and mom were considering leaving AGAINST MEDICAL ADVICE.  She is not having any other symptoms of fever, chest pain, shortness of breath, nausea, vomiting, abdominal pain, lightheadedness, dizziness.                           No data recorded                Patient History   Past Medical History:   Diagnosis Date    Anxiety     Body mass index (BMI) pediatric, 5th percentile to less than 85th percentile for age 07/16/2021    BMI (body mass index), pediatric, 5% to less than 85% for age    Body mass index (BMI) pediatric, 85th percentile to less than 95th percentile for age 06/10/2019    BMI (body mass index), pediatric, 85% to less than 95% for age    Body mass index (BMI) pediatric, 85th percentile to less than 95th percentile for age 07/10/2020    Body mass index (BMI) 85th to less than 95th percentile with athletic build, pediatric    Cataract     Chronic pain disorder     Encounter for general adult medical examination without abnormal findings 07/16/2021    Examination, routine, over 18 years of age    Encounter for immunization 10/19/2021    Encounter for immunization    Encounter for routine child health examination with abnormal findings 07/10/2020    Encounter for routine child health examination with abnormal findings    Encounter for routine child health examination without abnormal findings 06/10/2019    Encounter for routine child health examination without abnormal findings    Influenza due to other identified influenza virus with  other respiratory manifestations 02/06/2020    Influenza A    Other conditions influencing health status 01/24/2017    Concussion, without loss of consciousness, initial encounter    Other conditions influencing health status 02/01/2017    Concussion, without loss of consciousness, subsequent encounter    Otitis media, unspecified, bilateral 12/14/2017    Acute bilateral otitis media    Otitis media, unspecified, bilateral 08/29/2019    Acute bilateral otitis media    Personal history of diseases of the skin and subcutaneous tissue 07/10/2020    History of contact dermatitis    Personal history of other diseases of the digestive system 07/06/2021    History of constipation    Personal history of other diseases of the nervous system and sense organs 03/04/2015    History of acute otitis media    Personal history of other specified conditions 09/10/2021    History of vomiting    Pityriasis versicolor 06/13/2018    Tinea versicolor    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    Skin striae     Past Surgical History:   Procedure Laterality Date    NO PAST SURGERIES       Family History   Problem Relation Name Age of Onset    Other (OVERWIGHT) Mother      Hyperlipidemia Father      Other (OVERWEIGHT) Father      Hyperlipidemia Paternal Grandfather      Hypertension Paternal Grandfather      Melanoma Paternal Grandfather       Social History     Tobacco Use    Smoking status: Never    Smokeless tobacco: Never   Substance Use Topics    Alcohol use: Not on file    Drug use: Not on file       Physical Exam   ED Triage Vitals   Temp Pulse Resp BP   -- -- -- --      SpO2 Temp src Heart Rate Source Patient Position   -- -- -- --      BP Location FiO2 (%)     -- --       Physical Exam  Constitutional:       Appearance: Normal appearance.   HENT:      Mouth/Throat:      Mouth: Mucous membranes are moist.      Comments: Post tonsillectomy.  There is very minimal clotting at the left incision site.  There does not appear to be active bleeding  and hemostasis has been achieved.  Eyes:      Extraocular Movements: Extraocular movements intact.   Cardiovascular:      Rate and Rhythm: Normal rate and regular rhythm.      Pulses: Normal pulses.      Heart sounds: Normal heart sounds.   Pulmonary:      Effort: Pulmonary effort is normal. No respiratory distress.      Breath sounds: Normal breath sounds. No stridor. No wheezing or rales.   Chest:      Chest wall: No tenderness.   Abdominal:      General: Abdomen is flat. There is no distension.      Palpations: Abdomen is soft.      Tenderness: There is no abdominal tenderness. There is no guarding or rebound.   Musculoskeletal:         General: Normal range of motion.      Cervical back: Normal range of motion.   Skin:     General: Skin is warm and dry.   Neurological:      General: No focal deficit present.      Mental Status: She is alert.   Psychiatric:         Mood and Affect: Mood normal.         Behavior: Behavior normal.         ED Course & MDM   Diagnoses as of 01/2003   Post-tonsillectomy hemorrhage       Medical Decision Making  Chio is a 18-year-old female presented to the emergency department with bleeding at the incision site of her tonsillectomy.  She underwent a tonsillectomy with Dr. Powers on Friday, 1/05/24.  For the past hour, she has been spitting up bright red blood.  Patient was instructed to come to the emergency room if this happens.  According to mom, they did just speak to Dr. Poewrs on the phone he told them that they related them to come to the emergency department for this.  They considered leaving AGAINST MEDICAL ADVICE.    Differentials include hemorrhage and anemia.  This visit involved a lot of shared decision making.  When I first saw the patient they were highly considering leaving AGAINST MEDICAL ADVICE as they had just spoken with Dr. Powers and according to them he had recommended he would be okay if they were to return home.  I discussed that when there is bleeding at  the incision sites of tonsillectomies, it is typically a lot and difficult to control.  I would like to at least consult Dr. Powers and hear his professional opinion firsthand.  I was able to speak with Dr. Powers who is going to come in and evaluate the patient.  After seeing the patient, Dr. Powers agrees that it is okay to discharge her home.  He reinforced reasons to return to the emergency department including another incident of bleeding. Patient was also supposed to receive a dose of ibuprofen at 6 PM.  I ordered a dose of 600 mg ibuprofen to be given here in the emergency room.    Discussed plan with patient and her mother.  They were both agreeable to the plan and okay with discharge.        Procedure  Procedures     Shannon Woodall PA-C  01/2003       Shannon Woodall PA-C  01/08/24 2004

## 2024-01-09 ENCOUNTER — TELEPHONE (OUTPATIENT)
Dept: OTOLARYNGOLOGY | Facility: HOSPITAL | Age: 19
End: 2024-01-09
Payer: COMMERCIAL

## 2024-01-09 NOTE — DISCHARGE INSTRUCTIONS
You were seen in the emergency room today for post tonsillectomy bleeding.  Your surgeon, Dr. Powers, was able to evaluate you while you were here today and determined you are stable for discharge home.  Reasons to return to emergency department include bleeding, fever, extreme pain.

## 2024-01-09 NOTE — CONSULTS
Reason For Consult  Post-tonsillectomy bleed    History Of Present Illness  Chio Logan is a 18 y.o. female presenting with bleeding in the post-operative period after T&A.    The patient reports that starting around 4:30 PM she began having intermittent bleeding.  She reports that she had over the course of an hour several tablespoons of bleeding in spurts.  Believes that she may have swallowed some blood as well.  Prior to this had not been having any bleeding.  Has been controlling pain fairly well and has been staying hydrated by drinking.  Vital signs stable in the ED.  She was concerned because she saw a clot in the left tonsillar fossa.     Past Medical History  She has a past medical history of Anxiety, Body mass index (BMI) pediatric, 5th percentile to less than 85th percentile for age (07/16/2021), Body mass index (BMI) pediatric, 85th percentile to less than 95th percentile for age (06/10/2019), Body mass index (BMI) pediatric, 85th percentile to less than 95th percentile for age (07/10/2020), Cataract, Chronic pain disorder, Encounter for general adult medical examination without abnormal findings (07/16/2021), Encounter for immunization (10/19/2021), Encounter for routine child health examination with abnormal findings (07/10/2020), Encounter for routine child health examination without abnormal findings (06/10/2019), Influenza due to other identified influenza virus with other respiratory manifestations (02/06/2020), Other conditions influencing health status (01/24/2017), Other conditions influencing health status (02/01/2017), Otitis media, unspecified, bilateral (12/14/2017), Otitis media, unspecified, bilateral (08/29/2019), Personal history of diseases of the skin and subcutaneous tissue (07/10/2020), Personal history of other diseases of the digestive system (07/06/2021), Personal history of other diseases of the nervous system and sense organs (03/04/2015), Personal history of other specified  "conditions (09/10/2021), Pityriasis versicolor (06/13/2018), and Striae atrophicae (07/10/2020).    Surgical History  She has a past surgical history that includes No past surgeries.     Social History  She reports that she has never smoked. She has never used smokeless tobacco. No history on file for alcohol use and drug use.    Family History  Family History   Problem Relation Name Age of Onset    Other (OVERWIGHT) Mother      Hyperlipidemia Father      Other (OVERWEIGHT) Father      Hyperlipidemia Paternal Grandfather      Hypertension Paternal Grandfather      Melanoma Paternal Grandfather          Allergies  Patient has no known allergies.    Review of Systems  As noted above.  Otherwise negative.     Physical Exam  CONSTITUTIONAL: Well appearing female who appears stated age.  PSYCHIATRIC: Alert, appropriate mood and affect.  RESPIRATORY: Normal inspiration and expiration and chest wall expansion; no use of accessory muscles to breathe.  VOICE: Clear speech without hoarseness. No stridor nor stertor.  HEAD, FACE, AND SKIN: Symmetric facial feature.  OROPHARYNX: No lesion nor mucosal abnormality. The uvula was normal appearing. The tonsilar fossae are healing well with expected exudate.  No clot noted.  Small area of erythema located along the medial tonsillar fossa bed on the right.  Large area of erythema that appears to be the source of bleeding along the mucosal edge of the medial tonsillar resection bed.  No active bleeding.  No clot present.    Last Recorded Vitals  Blood pressure 114/69, pulse 81, temperature 36.4 °C (97.5 °F), resp. rate 18, height 1.702 m (5' 7\"), weight 90.7 kg (200 lb), last menstrual period 12/18/2023, SpO2 97 %.    Relevant Results  Lab Results   Component Value Date    WBC 10.4 01/04/2024    HGB 13.4 01/04/2024    HCT 40.6 01/04/2024    MCV 89 01/04/2024     01/04/2024        Assessment/Plan     18 y.o. female presenting postop day 3 status post tonsillectomy and " adenoidectomy with mild bleeding and small clot within the left tonsillar fossa that is since stopped.  On my exam there is a small area of erythema along the medial edge of the resection bed with evidence of recent bleeding.  No other signs of bleeding.  Patient's vital signs are stable.    Given that the patient is stable, the description of the patient's bleeding is quite mild, and the patient's physical exam is relatively benign, I believe that she is safe to watch this at home.  Advised the patient to return or to call me if there is recurrent bleeding.  Patient was very amenable to this plan.    Discussed with ED staff.    Azeem Powers MD

## 2024-01-09 NOTE — TELEPHONE ENCOUNTER
Called to check on patient after episode of bleeding 1/8/2024.  Patient reports no further bleeding overnight.  Notes some pain this morning but is about to take pain medication.  Drinking appropriately.  No other concerns at this time.

## 2024-01-10 LAB
LABORATORY COMMENT REPORT: NORMAL
PATH REPORT.FINAL DX SPEC: NORMAL
PATH REPORT.GROSS SPEC: NORMAL
PATH REPORT.RELEVANT HX SPEC: NORMAL
PATH REPORT.TOTAL CANCER: NORMAL

## 2024-02-12 ENCOUNTER — APPOINTMENT (OUTPATIENT)
Dept: OTOLARYNGOLOGY | Facility: CLINIC | Age: 19
End: 2024-02-12
Payer: COMMERCIAL

## 2024-02-19 DIAGNOSIS — F41.9 ANXIETY: ICD-10-CM

## 2024-02-19 RX ORDER — ESCITALOPRAM OXALATE 20 MG/1
TABLET ORAL
Qty: 30 TABLET | Refills: 0 | Status: SHIPPED | OUTPATIENT
Start: 2024-02-19 | End: 2024-03-07 | Stop reason: SDUPTHER

## 2024-02-19 NOTE — PROGRESS NOTES
Patient called after hours, has no more lexapro pills left, was told in November, no more refills until she is seen for follow up. I am, willing to send 30d today but she is aware that we not send any further meds without a follow up appointment.

## 2024-02-28 ENCOUNTER — OFFICE VISIT (OUTPATIENT)
Dept: OTOLARYNGOLOGY | Facility: CLINIC | Age: 19
End: 2024-02-28
Payer: COMMERCIAL

## 2024-02-28 VITALS
BODY MASS INDEX: 34.65 KG/M2 | TEMPERATURE: 97 F | HEART RATE: 69 BPM | WEIGHT: 220.8 LBS | RESPIRATION RATE: 16 BRPM | OXYGEN SATURATION: 99 % | SYSTOLIC BLOOD PRESSURE: 125 MMHG | HEIGHT: 67 IN | DIASTOLIC BLOOD PRESSURE: 73 MMHG

## 2024-02-28 DIAGNOSIS — J35.8 TONSIL STONE: Primary | ICD-10-CM

## 2024-02-28 DIAGNOSIS — J35.01 CHRONIC TONSILLITIS: ICD-10-CM

## 2024-02-28 PROCEDURE — 99024 POSTOP FOLLOW-UP VISIT: CPT | Performed by: STUDENT IN AN ORGANIZED HEALTH CARE EDUCATION/TRAINING PROGRAM

## 2024-02-28 PROCEDURE — 1036F TOBACCO NON-USER: CPT | Performed by: STUDENT IN AN ORGANIZED HEALTH CARE EDUCATION/TRAINING PROGRAM

## 2024-02-28 SDOH — ECONOMIC STABILITY: FOOD INSECURITY: WITHIN THE PAST 12 MONTHS, THE FOOD YOU BOUGHT JUST DIDN'T LAST AND YOU DIDN'T HAVE MONEY TO GET MORE.: NEVER TRUE

## 2024-02-28 SDOH — ECONOMIC STABILITY: FOOD INSECURITY: WITHIN THE PAST 12 MONTHS, YOU WORRIED THAT YOUR FOOD WOULD RUN OUT BEFORE YOU GOT MONEY TO BUY MORE.: NEVER TRUE

## 2024-02-28 ASSESSMENT — LIFESTYLE VARIABLES
AUDIT-C TOTAL SCORE: 0
SKIP TO QUESTIONS 9-10: 1
HOW OFTEN DO YOU HAVE SIX OR MORE DRINKS ON ONE OCCASION: NEVER
HOW MANY STANDARD DRINKS CONTAINING ALCOHOL DO YOU HAVE ON A TYPICAL DAY: PATIENT DOES NOT DRINK
HOW OFTEN DO YOU HAVE A DRINK CONTAINING ALCOHOL: NEVER

## 2024-02-28 ASSESSMENT — ENCOUNTER SYMPTOMS
LOSS OF SENSATION IN FEET: 0
DEPRESSION: 0
OCCASIONAL FEELINGS OF UNSTEADINESS: 0

## 2024-02-28 ASSESSMENT — PAIN SCALES - GENERAL: PAINLEVEL: 0-NO PAIN

## 2024-02-28 NOTE — PROGRESS NOTES
SUBJECTIVE  Patient ID: Chio Logan is a 18 y.o. female who presents for Follow-up (tonsils).    History 11/13/2023:  The patient reports recurrent tonsils stones; several times a week. These cause her discomfort and swelling. Seems worse on the right. Causing halitosis. Tried brushing her teeth; has not helped. Not having recurrent tonsil infections. Has not tried antibiotics for these yet.    No history surgery. No known family history of bleeding disorders.    Update 2/28/2024:  Postop visit status post tonsillectomy and adenoidectomy.  Patient had a small amount of bleeding today after our discussion over the telephone 1/9/2024.  Otherwise she did not have any significant trouble following surgery.  She did have significant pain which is completely resolved.  No dysphagia.  Notes some tightness at the glossotonsillar sulcus and is concerned that there may be scarring.  This is causing some mild discomfort    OBJECTIVE  Physical Exam  CONSTITUTIONAL: Well appearing female who appears stated age.  PSYCHIATRIC: Alert, appropriate mood and affect.  RESPIRATORY: Normal inspiration and expiration and chest wall expansion; no use of accessory muscles to breathe.  VOICE: Clear speech without hoarseness. No stridor nor stertor.  HEAD, FACE, AND SKIN: Symmetric facial feature.  OROPHARYNX: No lesion nor mucosal abnormality. The uvula was normal appearing. The tonsilar fossae are well lateralized and healing well. There is some scarring at the inferior aspect of the right anterior tonsillar pillar to the posterior base of tongue.  This is not limiting tongue movement.    ASSESSMENT/PLAN  Diagnoses and all orders for this visit:  Tonsil stone  Chronic tonsillitis    18 y.o. female here for chronic tonsillitis and tonsil stones.    Chronic tonsillitis, tonsil stones  The patient has longstanding issue with recurrent tonsil stones in the setting of chronic tonsillitis.  She is now status post tonsillectomy and  adenoidectomy 1/5/2024.  Postoperative course was complicated by episode of bleeding which was managed with observation.  She is noting some tightness at the right glossotonsillar sulcus and on exam there is a small area of scarring at the inferior base of the anterior tonsillar pillar.  This is not limiting her swallow.  She does note some mild pain with left lateral tongue movement.    At this time I recommended observation.  We discussed the scar tissue laterally is common and will slowly release with time.  However, if there is persistent scarring that is bothersome to her this can be released, potentially in clinic versus revision trip in the operating room.  Reassurance was provided.    She will continue to follow this and follow-up with me as needed.    This note was created using speech recognition transcription software. Despite proofreading, typographical errors may be present that affect the meaning of the content. Please contact my office with any questions.

## 2024-03-06 ENCOUNTER — TELEPHONE (OUTPATIENT)
Dept: PEDIATRICS | Facility: CLINIC | Age: 19
End: 2024-03-06

## 2024-03-07 DIAGNOSIS — F41.9 ANXIETY: ICD-10-CM

## 2024-03-07 RX ORDER — ESCITALOPRAM OXALATE 20 MG/1
TABLET ORAL
Qty: 30 TABLET | Refills: 0 | Status: SHIPPED | OUTPATIENT
Start: 2024-03-07 | End: 2024-04-13 | Stop reason: SDUPTHER

## 2024-04-13 ENCOUNTER — OFFICE VISIT (OUTPATIENT)
Dept: PEDIATRICS | Facility: CLINIC | Age: 19
End: 2024-04-13
Payer: COMMERCIAL

## 2024-04-13 VITALS
DIASTOLIC BLOOD PRESSURE: 79 MMHG | SYSTOLIC BLOOD PRESSURE: 123 MMHG | WEIGHT: 219 LBS | HEART RATE: 76 BPM | HEIGHT: 66 IN | BODY MASS INDEX: 35.2 KG/M2

## 2024-04-13 DIAGNOSIS — Z00.00 WELLNESS EXAMINATION: Primary | ICD-10-CM

## 2024-04-13 DIAGNOSIS — F41.9 ANXIETY: ICD-10-CM

## 2024-04-13 DIAGNOSIS — F50.9 EATING DISORDER, UNSPECIFIED TYPE: ICD-10-CM

## 2024-04-13 PROBLEM — J35.8 AMYGDALOLITH: Status: RESOLVED | Noted: 2023-11-13 | Resolved: 2024-04-13

## 2024-04-13 PROBLEM — F50.20 PURGING: Status: RESOLVED | Noted: 2023-02-24 | Resolved: 2024-04-13

## 2024-04-13 PROBLEM — K59.00 CONSTIPATION: Status: RESOLVED | Noted: 2024-04-13 | Resolved: 2024-04-13

## 2024-04-13 PROBLEM — F50.2: Status: RESOLVED | Noted: 2023-02-24 | Resolved: 2024-04-13

## 2024-04-13 PROBLEM — F45.22 BODY IMAGE DISTURBANCE: Status: RESOLVED | Noted: 2023-02-24 | Resolved: 2024-04-13

## 2024-04-13 PROCEDURE — 99395 PREV VISIT EST AGE 18-39: CPT | Performed by: PEDIATRICS

## 2024-04-13 PROCEDURE — 1036F TOBACCO NON-USER: CPT | Performed by: PEDIATRICS

## 2024-04-13 PROCEDURE — 3008F BODY MASS INDEX DOCD: CPT | Performed by: PEDIATRICS

## 2024-04-13 PROCEDURE — 99213 OFFICE O/P EST LOW 20 MIN: CPT | Performed by: PEDIATRICS

## 2024-04-13 RX ORDER — ESCITALOPRAM OXALATE 20 MG/1
TABLET ORAL
Qty: 30 TABLET | Refills: 5 | Status: SHIPPED | OUTPATIENT
Start: 2024-04-13

## 2024-04-13 NOTE — PROGRESS NOTES
Subjective   Chio is a 18 y.o. female who presents today for her Health Maintenance and Supervision Exam.    General Health:  Chio is overall in good health.  Concerns today: none     Social and Family History:  At home, there have been no interval changes.  Parental support, work/family balance? Yes    Nutrition:  Balanced diet? Yes  Current Diet: A variety of meats, vegetables, fruits with a calcium source.  Concerns about body image? No  Uses nutritional supplements? No    Dental Care:  Chio has a dental home? Yes  Dental hygiene regularly performed? Yes  Fluoridate water: Yes    Elimination:  Elimination patterns appropriate: Yes  Sleep:  Sleep patterns : 7-8 hrs   Sleep problems:  no    Behavior/Socialization:  Lives with  : has roommates. Lives in an office campus apt   Good relationships with parents and siblings? Yes  Supportive adult relationship? Yes  Permitted to make decisions? Yes  Responsibilities and chores? Yes  Family Meals? Yes  Normal peer relationships? Yes       Development/Education:  Age Appropriate: Yes    Chio is in her third year of college at [employer]  Any educational accommodations? No  Academically well adjusted? Yes  Performing at grade level? Yes  Socially well adjusted? Yes    Activities:  Physical Activity: Yes  Limited screen/media use: Yes  Extracurricular Activities/Hobbies/Interests: involved in her [employer]     Sports Participation Screening:  Pre-sports participation survey questions assessed and passed? Yes    Menstrual Status:  Regular cycle intervals: Yes    Sexual History:  Dating? no  Sexually Active? no    Drugs:  Tobacco? No  Uses drugs? No  Vaping? no    Risk Assessment:  Additional health risks: No    Safety Assessment:  Safety topics reviewed: Yes  Uses safety belts? Yes   Mouthguard for sports ? Yes   Working Smoke detectors/carbon monoxide detectors Yes   Firearms in the home? No   Secondhand smoke? No   Nonviolent home? Yes   Sunscreen use? Yes  "  Helmets/Sports safety gear? Yes     Mental Health:  Depression screening result Negative   Self confidence?? Yes   Coping Skills Yes   Thoughts of self harm/suicide? No     Objective   /79   Pulse 76   Ht 1.676 m (5' 6\")   Wt 99.3 kg (219 lb)   BMI 35.35 kg/m²   Growth parameters are noted and are appropriate for age.  General:   alert and oriented, in no acute distress   Gait:   normal   Skin:   normal   Oral cavity:   lips, mucosa, and tongue normal; teeth and gums normal   Eyes:   sclerae white, pupils equal and reactive   Ears:   normal bilaterally   Neck:   no adenopathy and thyroid not enlarged, symmetric, no tenderness/mass/nodules   Lungs:  clear to auscultation bilaterally   Heart:   regular rate and rhythm, S1, S2 normal, no murmur, click, rub or gallop   Abdomen:  soft, non-tender; bowel sounds normal; no masses, no organomegaly   :  normal external genitalia, no erythema, no discharge   Jagdeep Stage:   5   Extremities:  extremities normal, warm and well-perfused; no cyanosis, clubbing, or edema, negative forward bend   Neuro:  normal without focal findings and muscle tone and strength normal and symmetric     Assessment/Plan   Problem List Items Addressed This Visit       Anxiety    Current Assessment & Plan     Overall feels her anxiety is in good control. Feels the 20 mg of Lexapro is still good for her. Still has days where she feels anxious and needs to take hydroxyzine. Denies thoughts of self harm, suicide, harm to others. Not binging or purging. Not feeling bad about eating foods. Had a counselor but interacts with her irregularly due to school. Likes college, has friends. Is involved in her [employer]. Denies use of alcohol, marijuana or vaping. Not dating. Does engage in exercise.         Relevant Medications    escitalopram (Lexapro) 20 mg tablet    Eating disorder     Other Visit Diagnoses       Wellness examination    -  Primary    Body mass index (BMI) 35.0-35.9, adult             "   PLAN:  School performance, peer & dating relationships, growth measurements and BMI%, nutrition, and age appropriate exercise were reviewed at today's Health Maintenance Visit  2.   Advised to limit high sugar containing beverages (soda, juice, sports drinks) and excess caffeine  3.   Avoid skipped meals and excess portions  4.   Recommend a daily multivitamin  5.   Hearing, Vision and Lipid screening completed if appropriate for this patient  6.   PHQ-9 completed. Risk factors: No PHQ9 and SCARED QUESTIONNAIRES SCORES OF 0  8.   Vaccines reviewed and or updated if applicable. Will need a Tdap after 7/11/2024  7.   Follow up in 1 year for next well child exam or sooner with concerns.

## 2024-04-13 NOTE — ASSESSMENT & PLAN NOTE
Overall feels her anxiety is in good control. Feels the 20 mg of Lexapro is still good for her. Still has days where she feels anxious and needs to take hydroxyzine. Denies thoughts of self harm, suicide, harm to others. Not binging or purging. Not feeling bad about eating foods. Had a counselor but interacts with her irregularly due to school. Likes college, has friends. Is involved in her [employer]. Denies use of alcohol, marijuana or vaping. Not dating. Does engage in exercise.

## 2024-11-02 ENCOUNTER — OFFICE VISIT (OUTPATIENT)
Dept: URGENT CARE | Age: 19
End: 2024-11-02
Payer: COMMERCIAL

## 2024-11-02 ENCOUNTER — ANCILLARY PROCEDURE (OUTPATIENT)
Dept: URGENT CARE | Age: 19
End: 2024-11-02
Payer: COMMERCIAL

## 2024-11-02 VITALS
OXYGEN SATURATION: 97 % | SYSTOLIC BLOOD PRESSURE: 109 MMHG | TEMPERATURE: 98.6 F | DIASTOLIC BLOOD PRESSURE: 75 MMHG | HEART RATE: 112 BPM

## 2024-11-02 DIAGNOSIS — J02.9 SORE THROAT: Primary | ICD-10-CM

## 2024-11-02 DIAGNOSIS — R05.9 COUGH, UNSPECIFIED TYPE: ICD-10-CM

## 2024-11-02 DIAGNOSIS — R05.9 COUGH IN ADULT: ICD-10-CM

## 2024-11-02 DIAGNOSIS — J06.9 VIRAL UPPER RESPIRATORY TRACT INFECTION: ICD-10-CM

## 2024-11-02 LAB
POC RAPID INFLUENZA A: NEGATIVE
POC RAPID INFLUENZA B: NEGATIVE
POC RAPID STREP: NEGATIVE
POC SARS-COV-2 AG BINAX: NORMAL

## 2024-11-02 PROCEDURE — 71046 X-RAY EXAM CHEST 2 VIEWS: CPT | Performed by: NURSE PRACTITIONER

## 2024-11-02 RX ORDER — ALBUTEROL SULFATE 90 UG/1
2 INHALANT RESPIRATORY (INHALATION) EVERY 6 HOURS PRN
Qty: 8.5 G | Refills: 0 | Status: SHIPPED | OUTPATIENT
Start: 2024-11-02 | End: 2024-11-16

## 2024-11-02 RX ORDER — BENZONATATE 100 MG/1
100 CAPSULE ORAL 3 TIMES DAILY PRN
Qty: 30 CAPSULE | Refills: 0 | Status: SHIPPED | OUTPATIENT
Start: 2024-11-02 | End: 2024-11-12

## 2025-03-14 ENCOUNTER — APPOINTMENT (OUTPATIENT)
Dept: PRIMARY CARE | Facility: CLINIC | Age: 20
End: 2025-03-14
Payer: COMMERCIAL

## 2025-07-15 ENCOUNTER — APPOINTMENT (OUTPATIENT)
Dept: PRIMARY CARE | Facility: CLINIC | Age: 20
End: 2025-07-15
Payer: COMMERCIAL

## 2025-07-15 VITALS
DIASTOLIC BLOOD PRESSURE: 70 MMHG | WEIGHT: 211.4 LBS | HEIGHT: 66 IN | HEART RATE: 96 BPM | TEMPERATURE: 97.3 F | SYSTOLIC BLOOD PRESSURE: 110 MMHG | OXYGEN SATURATION: 98 % | BODY MASS INDEX: 33.97 KG/M2

## 2025-07-15 DIAGNOSIS — Z00.00 HEALTH MAINTENANCE EXAMINATION: Primary | ICD-10-CM

## 2025-07-15 PROCEDURE — 3008F BODY MASS INDEX DOCD: CPT | Performed by: FAMILY MEDICINE

## 2025-07-15 PROCEDURE — 99395 PREV VISIT EST AGE 18-39: CPT | Performed by: FAMILY MEDICINE

## 2025-07-15 PROCEDURE — 1036F TOBACCO NON-USER: CPT | Performed by: FAMILY MEDICINE

## 2025-07-15 RX ORDER — FLUTICASONE PROPIONATE 50 MCG
SPRAY, SUSPENSION (ML) NASAL
COMMUNITY
Start: 2024-11-05

## 2025-07-15 RX ORDER — ALBUTEROL SULFATE 0.83 MG/ML
SOLUTION RESPIRATORY (INHALATION)
COMMUNITY
Start: 2024-11-05 | End: 2025-07-15 | Stop reason: WASHOUT

## 2025-07-15 ASSESSMENT — ENCOUNTER SYMPTOMS
LOSS OF SENSATION IN FEET: 0
DEPRESSION: 0
OCCASIONAL FEELINGS OF UNSTEADINESS: 0

## 2025-07-15 NOTE — ASSESSMENT & PLAN NOTE
Recommended screening guidelines addressed and orders placed as indicated by age and chronic conditions  Screening labs reviewed and no further recommended  Do encourage pt to follow up with her GYN for PAP smear as she is 20yo  Continue to work on healthy lifestyle including well balanced diet, regular activity, limit alcohol, no tobacco products and safe sexual practices  Follow up annually

## (undated) DEVICE — SYRINGE, 60 CC, IRRIGATION, BULB, CONTRO-BULB, PAPER POUCH

## (undated) DEVICE — SPONGE, TONSIL, DOUBLE STRUNG, LARGE, 1 IN

## (undated) DEVICE — SOLUTION, ANTI FOG, W/SPONGE, ENDOMATE

## (undated) DEVICE — BASIN KIT, SINGLE

## (undated) DEVICE — Device

## (undated) DEVICE — SLEEVE, VASO PRESS, CALF GARMENT, MEDIUM, GREEN

## (undated) DEVICE — TUBING, CLEAR N-COND, 5MM X 10, LF

## (undated) DEVICE — HANDLE, PH, FOR YANKAUER SUCTION DEVICE

## (undated) DEVICE — TIP, ELECTROSURGICAL, 4IN BLADE, MODIFIED, EXTENDED